# Patient Record
Sex: FEMALE | Race: ASIAN | URBAN - METROPOLITAN AREA
[De-identification: names, ages, dates, MRNs, and addresses within clinical notes are randomized per-mention and may not be internally consistent; named-entity substitution may affect disease eponyms.]

---

## 2017-04-14 ENCOUNTER — HOSPITAL ENCOUNTER (OUTPATIENT)
Facility: CLINIC | Age: 22
Setting detail: OBSERVATION
Discharge: HOME | End: 2017-04-15
Attending: EMERGENCY MEDICINE | Admitting: EMERGENCY MEDICINE
Payer: COMMERCIAL

## 2017-04-14 DIAGNOSIS — T78.1XXA ADVERSE FOOD REACTION, INITIAL ENCOUNTER: ICD-10-CM

## 2017-04-14 DIAGNOSIS — T78.00XA ANAPHYLAXIS DUE TO FOOD, INITIAL ENCOUNTER: ICD-10-CM

## 2017-04-14 DIAGNOSIS — T78.04XA ANAPHYLACTIC REACTION DUE TO FRUITS AND VEGETABLES, INITIAL ENCOUNTER: ICD-10-CM

## 2017-04-14 DIAGNOSIS — J45.909 ASTHMATIC BRONCHITIS, UNSPECIFIED ASTHMA SEVERITY, UNCOMPLICATED: ICD-10-CM

## 2017-04-14 PROBLEM — T78.2XXA ANAPHYLACTIC REACTION: Status: ACTIVE | Noted: 2017-04-14

## 2017-04-14 PROCEDURE — 96372 THER/PROPH/DIAG INJ SC/IM: CPT | Performed by: EMERGENCY MEDICINE

## 2017-04-14 PROCEDURE — 96375 TX/PRO/DX INJ NEW DRUG ADDON: CPT | Performed by: EMERGENCY MEDICINE

## 2017-04-14 PROCEDURE — 99285 EMERGENCY DEPT VISIT HI MDM: CPT | Mod: Z6 | Performed by: EMERGENCY MEDICINE

## 2017-04-14 PROCEDURE — 25000128 H RX IP 250 OP 636

## 2017-04-14 PROCEDURE — 25000128 H RX IP 250 OP 636: Performed by: EMERGENCY MEDICINE

## 2017-04-14 PROCEDURE — 99285 EMERGENCY DEPT VISIT HI MDM: CPT | Mod: 25 | Performed by: EMERGENCY MEDICINE

## 2017-04-14 PROCEDURE — 96374 THER/PROPH/DIAG INJ IV PUSH: CPT | Performed by: EMERGENCY MEDICINE

## 2017-04-14 RX ORDER — SODIUM CHLORIDE 9 MG/ML
INJECTION, SOLUTION INTRAVENOUS CONTINUOUS
Status: DISCONTINUED | OUTPATIENT
Start: 2017-04-14 | End: 2017-04-15 | Stop reason: HOSPADM

## 2017-04-14 RX ORDER — ALBUTEROL SULFATE 0.83 MG/ML
2.5 SOLUTION RESPIRATORY (INHALATION)
Status: DISCONTINUED | OUTPATIENT
Start: 2017-04-14 | End: 2017-04-15 | Stop reason: HOSPADM

## 2017-04-14 RX ORDER — METHYLPREDNISOLONE SODIUM SUCCINATE 125 MG/2ML
125 INJECTION, POWDER, LYOPHILIZED, FOR SOLUTION INTRAMUSCULAR; INTRAVENOUS ONCE
Status: COMPLETED | OUTPATIENT
Start: 2017-04-14 | End: 2017-04-14

## 2017-04-14 RX ORDER — LIDOCAINE 40 MG/G
CREAM TOPICAL
Status: DISCONTINUED | OUTPATIENT
Start: 2017-04-14 | End: 2017-04-15

## 2017-04-14 RX ORDER — EPINEPHRINE 1 MG/ML
INJECTION INTRAMUSCULAR; INTRAVENOUS; SUBCUTANEOUS
Status: COMPLETED
Start: 2017-04-14 | End: 2017-04-14

## 2017-04-14 RX ORDER — EPINEPHRINE 0.3 MG/.3ML
0.3 INJECTION SUBCUTANEOUS
Status: DISCONTINUED | OUTPATIENT
Start: 2017-04-14 | End: 2017-04-15 | Stop reason: HOSPADM

## 2017-04-14 RX ORDER — LIDOCAINE 40 MG/G
CREAM TOPICAL
Status: DISCONTINUED | OUTPATIENT
Start: 2017-04-14 | End: 2017-04-15 | Stop reason: HOSPADM

## 2017-04-14 RX ORDER — EPINEPHRINE 1 MG/ML
0.3 INJECTION INTRAMUSCULAR; INTRAVENOUS; SUBCUTANEOUS
Status: COMPLETED | OUTPATIENT
Start: 2017-04-14 | End: 2017-04-14

## 2017-04-14 RX ORDER — DIPHENHYDRAMINE HYDROCHLORIDE 50 MG/ML
50 INJECTION INTRAMUSCULAR; INTRAVENOUS ONCE
Status: COMPLETED | OUTPATIENT
Start: 2017-04-14 | End: 2017-04-14

## 2017-04-14 RX ADMIN — DIPHENHYDRAMINE HYDROCHLORIDE 50 MG: 50 INJECTION, SOLUTION INTRAMUSCULAR; INTRAVENOUS at 20:19

## 2017-04-14 RX ADMIN — EPINEPHRINE 0.3 MG: 1 INJECTION INTRAMUSCULAR; INTRAVENOUS; SUBCUTANEOUS at 20:10

## 2017-04-14 RX ADMIN — RANITIDINE HYDROCHLORIDE 50 MG: 25 INJECTION INTRAMUSCULAR; INTRAVENOUS at 20:21

## 2017-04-14 RX ADMIN — METHYLPREDNISOLONE SODIUM SUCCINATE 125 MG: 125 INJECTION, POWDER, LYOPHILIZED, FOR SOLUTION INTRAMUSCULAR; INTRAVENOUS at 20:23

## 2017-04-14 ASSESSMENT — ENCOUNTER SYMPTOMS
FACIAL SWELLING: 1
COLOR CHANGE: 1
TROUBLE SWALLOWING: 1
VOMITING: 0

## 2017-04-14 NOTE — IP AVS SNAPSHOT
Unit [address] Observation [address] [address]    Phone:  [phone]    Fax:  [phone]                                       After Visit Summary   4/14/2017    Rosalie Arambula    MRN: [id number]           After Visit Summary Signature Page     I have received my discharge instructions, and my questions have been answered. I have discussed any challenges I see with this plan with the nurse or doctor.    ..........................................................................................................................................  Patient/Patient Representative Signature      ..........................................................................................................................................  Patient Representative Print Name and Relationship to Patient    ..................................................               ................................................  Date                                            Time    ..........................................................................................................................................  Reviewed by Signature/Title    ...................................................              ..............................................  Date                                                            Time

## 2017-04-14 NOTE — IP AVS SNAPSHOT
MRN:[id number]                      After Visit Summary   4/14/2017    Rosalie Arambula    MRN: [id number]           Thank you!     Thank you for choosing [facility] for your care. Our goal is always to provide you with excellent care. Hearing back from our patients is one way we can continue to improve our services. Please take a few minutes to complete the written survey that you may receive in the mail after you visit with us. Thank you!        Patient Information     Date Of Birth          1995        Designated Caregiver       Most Recent Value    Caregiver    Will someone help with your care after discharge? no      About your hospital stay     You were admitted on:  April 14, 2017 You last received care in the:  Unit 6D Observation [facility]    You were discharged on:  April 15, 2017        Reason for your hospital stay       You were admitted to observation after you had an allergic reaction to jackfruit. You were given steroids, benadryl, zantac, Epi-pen, and Claritin. Your symptoms resolved and after > 12 hours you did not have any recurrent symptoms. You will be discharged with Claritin for a few days and as needed after this, Benadryl as needed, and an Epi-pen as needed.    If you begin to feel the symptoms of another reaction in the future, use the Epi-Pen to inject yourself, and then call 911. Don t wait until symptoms become severe.                  Who to Call     For medical emergencies, please call 911.  For non-urgent questions about your medical care, please call your primary care provider or clinic, [phone]          Attending Provider     Provider Damion Nesbitt MD Emergency Medicine    Leon Rodríguez MD Emergency Medicine       Primary Care Provider Office Phone # Fax #    Kita Marquez [phone]       [facility] [address]  [facility] [address]         When to contact your care team       Return to the ED  with throat swelling, lip swelling, shortness of breath, chest pain, wheezing, uncontrolled nausea, vomiting, unrelieved pain, bleeding not relieved with pressure, dizziness, chest pain, shortness of breath, loss of consciousness, and any new or concerning symptoms.                  After Care Instructions     Activity       Your activity upon discharge: activity as tolerated and no driving while taking sedating medications.            Diet       Follow this diet upon discharge:    Regular Diet Adult, Avoid Ren fruit                  Follow-up Appointments     Adult [facility] Follow-up and recommended labs and tests       Follow up with primary care provider, Dr. Cole on Tuesday April 18 at 1:35 PM for hospital follow- up and to establish care. Please bring a list of medication with you and consider getting your previous medical records transferred to there. Your primary doctor can consider doing allergy testing. You can also discuss a medical alert bracelet.     [facility]  [address] [place] [address]   4 th Floor   Appointments on [facility] and/or [facility] (with [facility] or [facility] provider or service). Call [phone] if you haven't heard regarding these appointments within 7 days of discharge.                  Your next 10 appointments already scheduled     Apr 18, 2017  1:50 PM CDT   (Arrive by 1:35 PM)   New Patient Visit with Mahin iMttal MD   [facility] ([facility])    [address] [place] [address]  4th Floor  [facility] [address] [phone]              Pending Results     No orders found for last 3 day(s).            Statement of Approval     Ordered          04/15/17 1158  I have reviewed and agree with all the recommendations and orders detailed in this document.  EFFECTIVE NOW     Approved and electronically signed by:  Ginette Hall APRN CNP             Admission Information     Date & Time  "Provider Department Dept. Phone    2017 Leon Rodríguez MD Unit 6D Observation [facility] [phone]      Your Vitals Were     Blood Pressure Pulse Temperature Respirations Height Weight    119/66 (BP Location: Right leg) 67 98.4  F (36.9  C) (Oral) 16 1.549 m (5' 1\") 56.7 kg (125 lb)    Pulse Oximetry BMI (Body Mass Index)                97% 23.62 kg/m2          [employer] Information     [employer] lets you send messages to your doctor, view your test results, renew your prescriptions, schedule appointments and more. To sign up, go to [email or web address] . Click on \"Log in\" on the left side of the screen, which will take you to the Welcome page. Then click on \"Sign up Now\" on the right side of the page.     You will be asked to enter the access code listed below, as well as some personal information. Please follow the directions to create your username and password.     Your access code is: [email or web address]  Expires: 2017 11:49 AM     Your access code will  in 90 days. If you need help or a new code, please call your [place] clinic or [phone].        Care EveryWhere ID     This is your Care EveryWhere ID. This could be used by other organizations to access your [facility] [place] medical records  [id number]           Review of your medicines      START taking        Dose / Directions    diphenhydrAMINE 25 MG capsule   Commonly known as:  BENADRYL   Used for:  Anaphylaxis due to food, initial encounter        Dose:  25-50 mg   Take 1-2 capsules (25-50 mg) by mouth every 6 hours as needed for itching   Quantity:  20 capsule   Refills:  0       EPINEPHrine 0.3 MG/0.3ML injection   Used for:  Anaphylaxis due to food, initial encounter        Dose:  0.3 mg   Inject 0.3 mLs (0.3 mg) into the muscle once as needed for other (For airway compromise, wheezing, SOB, stridor)   Quantity:  0.6 mL   Refills:  0       loratadine 10 MG tablet   Commonly known as:  CLARITIN   Used for:  Anaphylaxis due to " food, initial encounter        Dose:  10 mg   Take 1 tablet (10 mg) by mouth daily For 1-2 days after discharge, then as needed after this   Quantity:  10 tablet   Refills:  0         CONTINUE these medicines which have NOT CHANGED        Dose / Directions    albuterol 108 (90 BASE) MCG/ACT Inhaler   Commonly known as:  PROAIR HFA/PROVENTIL HFA/VENTOLIN HFA        Dose:  2 puff   Inhale 2 puffs into the lungs every 6 hours as needed for shortness of breath / dyspnea or wheezing   Refills:  0       SINGULAIR PO        Refills:  0            Where to get your medicines      These medications were sent to [facility] - [place], [place] - [address]  [address] [address]     Phone:  [phone]     diphenhydrAMINE 25 MG capsule    EPINEPHrine 0.3 MG/0.3ML injection    loratadine 10 MG tablet                Protect others around you: Learn how to safely use, store and throw away your medicines at www.disposemymeds.org.             Medication List: This is a list of all your medications and when to take them. Check marks below indicate your daily home schedule. Keep this list as a reference.      Medications           Morning Afternoon Evening Bedtime As Needed    albuterol 108 (90 BASE) MCG/ACT Inhaler   Commonly known as:  PROAIR HFA/PROVENTIL HFA/VENTOLIN HFA   Inhale 2 puffs into the lungs every 6 hours as needed for shortness of breath / dyspnea or wheezing                                diphenhydrAMINE 25 MG capsule   Commonly known as:  BENADRYL   Take 1-2 capsules (25-50 mg) by mouth every 6 hours as needed for itching                                EPINEPHrine 0.3 MG/0.3ML injection   Inject 0.3 mLs (0.3 mg) into the muscle once as needed for other (For airway compromise, wheezing, SOB, stridor)                                loratadine 10 MG tablet   Commonly known as:  CLARITIN   Take 1 tablet (10 mg) by mouth daily For 1-2 days after discharge, then as needed  after this   Last time this was given:  10 mg on 4/15/2017  9:13 AM                                IBIS CHIN                                          More Information        Anaphylaxis  Anaphylaxis is the term for a severe allergic reaction. It may begin within minutes, up to a couple hours after exposure to the substance you are allergic to ( allergen ). Symptoms include nausea, vomiting, diarrhea or stomach cramps, itchy rash (hives), swelling of the eyes, lips, face or tongue, wheezing, difficulty breathing or swallowing, throat tightness, chest pain, dizziness or fainting.  This kind of reaction can be life-threatening. Fortunately, your case has responded to treatment. Any remaining symptoms should resolve within 6 to 24 hours.  If you are exposed to the same substance again, you may have the same or more severe reaction. Treatment for anaphylaxis is epinephrine (adrenalin). This is available by prescription as Epi-Pen for self-injection. If the cause of your reaction is known, you should avoid exposure in the future. If the cause is not known, follow up with your doctor for special testing to determine what you are allergic to.  Home care    Rest at home for the next 24 hours.    Avoid tobacco and alcohol consumption. These may worsen your symptoms.    If you know what caused your reaction today, avoid that in the future since the next reaction may be worse. Let your family members, friends and personal physician know about your allergic reaction.    If your allergy was to food, learn how to read food labels so you can check for the offending substance. If a product does not have a label, it is best to avoid it.    Consider carrying an identification card or getting a Medic-Alert  bracelet to inform medical personnel of your condition in the event you are not able to do so yourself.    If an Epi-Pen was prescribed, carry it at all times. It can be life-saving. Learn how to use the device. If you begin to  feel the symptoms of another reaction in the future, use the Epi-Pen to inject yourself, and then call 911. Don t wait until symptoms become severe.    Oral Benadryl (diphenhydramine) is an antihistamine available at drug and grocery stores. Unless a prescription antihistamine was given, Benadryl may be used to reduce itching if large areas of the skin are involved. Use lower doses during the daytime and higher doses at bedtime since the drug may make you sleepy. [NOTE: Do not use Benadryl if you have glaucoma or if you are a man with trouble urinating due to an enlarged prostate.] Claritin (loratidine) is an antihistamine that causes less drowsiness and is a good alternative for daytime use.    If you were prescribed any medicines to prevent symptoms from returning, be sure to take them exactly as directed.  Follow-up care  Follow up with your doctor or as advised if you are not improving over the next 1 to 2 days. If you do not know what caused this reaction, skin and blood tests, or an  elimination diet  may be helpful. You may locate an allergy specialist in your area by contacting:    American Academy of Allergy, Asthma & Immunology www.aaaai.org [phone]    American College of Allergy, Asthma & Immunology www.acaai.org  When to seek medical advice  Call your health care provider right away if any of these occur:    Worsening of your symptoms    Trouble breathing or swallowing    Swelling in the mouth or face    Chest pain    Dizziness, weakness or fainting    1251-1776 [employer]. [address] [place] [address], [place] [address]. All rights reserved. This information is not intended as a substitute for professional medical care. Always follow your healthcare professional's instructions.

## 2017-04-15 VITALS
HEART RATE: 67 BPM | HEIGHT: 61 IN | DIASTOLIC BLOOD PRESSURE: 66 MMHG | WEIGHT: 125 LBS | SYSTOLIC BLOOD PRESSURE: 119 MMHG | TEMPERATURE: 98.4 F | OXYGEN SATURATION: 97 % | RESPIRATION RATE: 16 BRPM | BODY MASS INDEX: 23.6 KG/M2

## 2017-04-15 PROCEDURE — 96361 HYDRATE IV INFUSION ADD-ON: CPT

## 2017-04-15 PROCEDURE — 25000132 ZZH RX MED GY IP 250 OP 250 PS 637: Performed by: NURSE PRACTITIONER

## 2017-04-15 PROCEDURE — G0378 HOSPITAL OBSERVATION PER HR: HCPCS

## 2017-04-15 PROCEDURE — 99236 HOSP IP/OBS SAME DATE HI 85: CPT | Mod: Z6 | Performed by: EMERGENCY MEDICINE

## 2017-04-15 PROCEDURE — 25000128 H RX IP 250 OP 636: Performed by: NURSE PRACTITIONER

## 2017-04-15 RX ORDER — ALBUTEROL SULFATE 90 UG/1
2 AEROSOL, METERED RESPIRATORY (INHALATION) EVERY 6 HOURS PRN
COMMUNITY

## 2017-04-15 RX ORDER — DIPHENHYDRAMINE HCL 25 MG
25-50 CAPSULE ORAL EVERY 6 HOURS PRN
Status: DISCONTINUED | OUTPATIENT
Start: 2017-04-15 | End: 2017-04-15 | Stop reason: HOSPADM

## 2017-04-15 RX ORDER — DIPHENHYDRAMINE HCL 25 MG
25-50 CAPSULE ORAL EVERY 6 HOURS
Status: DISCONTINUED | OUTPATIENT
Start: 2017-04-15 | End: 2017-04-15

## 2017-04-15 RX ORDER — DIPHENHYDRAMINE HCL 25 MG
25-50 CAPSULE ORAL EVERY 6 HOURS PRN
Status: DISCONTINUED | OUTPATIENT
Start: 2017-04-15 | End: 2017-04-15

## 2017-04-15 RX ORDER — EPINEPHRINE 0.3 MG/.3ML
0.3 INJECTION SUBCUTANEOUS
Qty: 0.6 ML | Refills: 0 | Status: SHIPPED | OUTPATIENT
Start: 2017-04-15

## 2017-04-15 RX ORDER — DIPHENHYDRAMINE HCL 25 MG
25-50 CAPSULE ORAL EVERY 6 HOURS PRN
Qty: 20 CAPSULE | Refills: 0 | Status: SHIPPED | OUTPATIENT
Start: 2017-04-15

## 2017-04-15 RX ORDER — LORATADINE 10 MG/1
10 TABLET ORAL DAILY
Status: DISCONTINUED | OUTPATIENT
Start: 2017-04-15 | End: 2017-04-15 | Stop reason: HOSPADM

## 2017-04-15 RX ORDER — LORATADINE 10 MG/1
10 TABLET ORAL DAILY
Qty: 10 TABLET | Refills: 0 | Status: SHIPPED | OUTPATIENT
Start: 2017-04-15

## 2017-04-15 RX ADMIN — SODIUM CHLORIDE: 9 INJECTION, SOLUTION INTRAVENOUS at 00:33

## 2017-04-15 RX ADMIN — LORATADINE 10 MG: 10 TABLET ORAL at 09:13

## 2017-04-15 NOTE — PLAN OF CARE
Problem: Discharge Planning  Goal: Discharge Planning (Adult, OB, Behavioral, Peds)  List all goals to be met before discharge home:     - No return of symptoms after 12 hours of observation : IN PROGRESS  - No oropharyngeal edema or bronchospasm. Nursing to check every 30 minutes for the first 4 hours then hourly for the next 4 hours. Notify BERTHA of alteration : YES - no edema noted, tongue still slightly swollen. Will monitor  - Vital signs normal or at patient baseline (BP >100/80, HR between 60 and 100, notify BERTHA of alteration. Check BP, HR and respirations (14 to 20 per minute)Nursing to check every 30 minutes for the first 4 hours then hourly for the next 4 hours. Notify BERTHA of alteration : YES  -Report any wheezing, stridor, poor color, tachycardia or hives immediately   - Tolerating oral intake to maintain hydration : NO  - Dyspnea improved if present and oxygen saturations greater than 94% on room air. IF below 94% notify BERTHA immediately : YES - no dyspnea at this time  -Patient to awake to voice on each check. If not, notify BERTHA immediately : YES  - Safe disposition plan has been identified : NO        Patient remains very sedated and lethargic, but arouses to voice and is able to answer questions appropriately. Patient denies pain, itching, swelling or other symptoms at this time.    Nurse to notify provider when observation goals have been met and patient is ready for discharge.

## 2017-04-15 NOTE — ED PROVIDER NOTES
History     Chief Complaint   Patient presents with     Allergic Reaction     food     HPI  Rosalie Arambula is a 22-year-old female with a past history significant for asthma who presented to the emergency room with friends after having an allergic reaction.  Approximately 20 minutes prior to presentation she ate some jackfruit and began to feel itchy and swelling in her throat and swelling of her lips and face.  She noted that the skin on her face turned red.  She s never had a reaction like this in the past.  There was no syncope or vomiting and no difficulty breathing.      This part of the medical record was transcribed by Eduardo Kitchen Medical Scribe, from a dictation done by Damion Montoya MD.   I have reviewed the Medications, Allergies, Past Medical and Surgical History, and Social History in the [facility] system.    PAST MEDICAL HISTORY  Past Medical History:   Diagnosis Date     Allergic state      PAST SURGICAL HISTORY  History reviewed. No pertinent surgical history.  FAMILY HISTORY  No family history on file.  SOCIAL HISTORY  Social History   Substance Use Topics     Smoking status: Never Smoker     Smokeless tobacco: Not on file     Alcohol use No     MEDICATIONS  Current Facility-Administered Medications   Medication     lidocaine 1 % 1 mL     lidocaine (LMX4) kit     sodium chloride (PF) 0.9% PF flush 3 mL     sodium chloride (PF) 0.9% PF flush 3 mL     lidocaine 1 % 1 mL     lidocaine (LMX4) kit     sodium chloride (PF) 0.9% PF flush 3 mL     sodium chloride (PF) 0.9% PF flush 3 mL     EPINEPHrine injection 0.3 mg     albuterol neb solution 2.5 mg     0.9% sodium chloride infusion     ALLERGIES  Allergies   Allergen Reactions     Fruit Extracts Anaphylaxis     Oral swelling, hives. Allergic to Ren Fruie      Review of Systems   HENT: Positive for facial swelling and trouble swallowing.    Gastrointestinal: Negative for vomiting.   Skin: Positive for color change.   Neurological: Negative for  "syncope.   All other systems reviewed and are negative.      Physical Exam   BP: 119/75  Pulse: 63  Temp: 97  F (36.1  C)  Resp: 12  Height: 154.9 cm (5' 1\")  Weight: 56.7 kg (125 lb)  SpO2: 97 %  Physical Exam   Constitutional: She is oriented to person, place, and time. Vital signs are normal. She appears well-developed and well-nourished.  Non-toxic appearance. She does not appear ill. No distress.   Patient is awake and alert, she appears mildly anxious but is otherwise in no acute distress.  Voice is mildly hoarse but she is protecting her airway without difficulty and handling her secretions without difficulty.   HENT:   Head: Normocephalic and atraumatic.   Mouth/Throat: Oropharynx is clear and moist. No oropharyngeal exudate.   Mild angioedema of upper and lower lips as well as her the periorbital area is noted.   Eyes: Conjunctivae and EOM are normal. Pupils are equal, round, and reactive to light. No scleral icterus.   Neck: Normal range of motion. Neck supple. No JVD present. No tracheal deviation present. No thyromegaly present.   Cardiovascular: Normal rate, regular rhythm, normal heart sounds and intact distal pulses.  Exam reveals no gallop and no friction rub.    No murmur heard.  Pulmonary/Chest: Effort normal and breath sounds normal. No respiratory distress.   Abdominal: Soft. Bowel sounds are normal. She exhibits no distension and no mass. There is no tenderness.   Musculoskeletal: Normal range of motion. She exhibits no edema or tenderness.   Lymphadenopathy:     She has no cervical adenopathy.   Neurological: She is alert and oriented to person, place, and time. She has normal strength. No cranial nerve deficit or sensory deficit.   Skin: Skin is warm and dry. Rash (erythematous rash noted on face) noted. No erythema. No pallor.   Psychiatric: She has a normal mood and affect. Her behavior is normal.   Nursing note and vitals reviewed.      ED Course     ED Course     Procedures      "       Critical Care time:  was 30 minutes for this patient excluding procedures.      Assessments & Plan (with Medical Decision Making)   This patient presented emergency department after having a reaction to a jackfruit.  Given the rash, angioedema of the lips and face and the sensation of throat closing up I feel that the symptoms are consistent with an anaphylactic reaction.  She was given a 0.3 mg of intramuscular epinephrine 1-1000 and 50 mg of IV Benadryl, 10 mg of IV Zantac, and 125 mg of IV Solu-Medrol.  Symptoms improved and the patient began to sleep secondary to the Benadryl.  She was kept monitored in the emergency department and did not have return of symptoms.  Given the nature of the allergen ingestion I feel that she is potentially at risk for biphasic reaction and feel that overnight observation is warranted.  Case was discussed with the BERTHA in the observation unit and patient will be transferred to the observation unit for further treatment and evaluation.  She will need an EpiPen prescribed at discharge given the nature of this reaction.    This part of the medical record was transcribed by Eduardo Kitchen, Medical Scribe, from a dictation done by Damion Montoya MD.     I have reviewed the nursing notes.    I have reviewed the findings, diagnosis, plan and need for follow up with the patient.    There are no discharge medications for this patient.      Final diagnoses:   Anaphylaxis due to food, initial encounter       4/14/2017   [facility], [place], EMERGENCY DEPARTMENT     Damion Montoya MD  04/14/17 9638

## 2017-04-15 NOTE — H&P
"  Admission Date: 04/14/17  Attending Physician: Dr. Rodríguez  Primary Care Physician: Noelle Bowden  NP/PA: Rasheeda Griffin, PRISCA, CNP    REASON FOR ADMISSION:     Chief Complaint   Patient presents with     Allergic Reaction     food     History of Present Illness, per ER MD:   \"Rosalie Arambula is a 22-year-old female with a past history significant for asthma who presented to the emergency room with friends after having an allergic reaction. Approximately 20 minutes prior to presentation she ate some jackfruit and began to feel itchy and swelling in her throat and swelling of her lips and face. She noted that the skin on her face turned red. She s never had a reaction like this in the past. There was no syncope or vomiting and no difficulty breathing\".   ED Course: IV established. MEDS: 0.3 mg IM epi 1:1000, Benadryl 50 mg IV, Zantac 50 mg IV, Solu-medrol 125 mg IV.  Patient will be admitted to the ED observation unit to monitor for biphasic reaction and possible return of sx's.   Observation Unit Arrival: Arrived on unit 2255, sleepy but awakens to voice. Accompanied by parents.     REVIEW OF SYSTEMS:  Unchanged from ER.   PAST MEDICAL HISTORY:  Past Medical History:   Diagnosis Date     Allergic state      PAST SURGICAL HISTORY:  History reviewed. No pertinent surgical history.    SOCIAL HISTORY:  Social History   Substance Use Topics     Smoking status: Never Smoker     Smokeless tobacco: Not on file     Alcohol use No     FAMILY HISTORY:  Patient is adopted, no family history available    ALLERGIES:  Ren Fruit    MEDICATIONS:  Infrequently used albuterol inhaler    PHYSICAL EXAM:   111/65, T: 97, P: 63, R: 12    All vital signs were reviewed.  GENERAL APPEARENCE: Pleasant, generally appears well, A/O x4 but very sleepy. NAD. Denies allergy sx's  SKIN: Clean, dry, and intact without visible lesions, rash, jaundice, cyanosis, erythema, ecchymoses to exposed areas. No hives  HEENT: NCAT w/out masses, " lesions, or abnormalities. Sclera anicteric, PERRLA, EOMI.  Oral mucosa pink and moist without erythema, exudate, lesions, ulcerations, or thrush. Teeth and gums normal.    NECK: Supple, no masses. No jugular venous distention.   CARDIOVASCULAR: S1, S2 RRR. No murmurs, rubs, or gallops.   RESPIRATORY: Respiratory effort WNL. CTA  bilaterally without crackles/rales/wheeze   GI: Active BS in all 4 quadrants. Abdomen soft and non-tender. No masses or hepatosplenomegaly.  : Deferred  MUSCULOSKELETAL: Gait is not observed as patient is sleeping. Strength 5/5 in major muscle groups of bilateral UE and LE.  Extremities normal, no gross deformities noted, non-tender to palpation.   PV: 2+ bilateral radial and pedal pulses. No edema noted.   NEURO: CN II-XII grossly intact. Speech normal. Appropriate throughout interview. Sleepy, mother supplying info  Sensation grossly WNL.   PSYCH: Appropriate affect  HEME/LYMPH: No visible bleeding. No palpable mandibular/cervical/supra/infraclavicular lymph nodes  PSYCHIATRIC: Mentation and affect appear normal  VASCULAR ACCESS: CDI without erythema or discharge. Non-tender.      DATA :  No results found for this or any previous visit (from the past 24 hour(s)).    ASSESSMENT/PLAN:   Rosalie Arambula is a 22-year-old female with an asthma history, who presented to the emergency room with an allergic reaction to jackfruit and began to feel itchy and swelling in her throat and swelling of her lips and face. She noted that the skin on her face turned red. She s never had a reaction like this in the past. There was no syncope or vomiting and no difficulty breathing. She is admitting to the obs unit s/p tx in the ER, for continued monitoring for return of her allergic sx's. Patient is quite sleepy after receiving medications, especially Benadryl. Majority of history obtained from mother. Minimal information in chart.     #. Allergic Reaction to Food: As above. No previous hx of allergic  reactions. Patient had no respiratory involvement except a bit of hoarse voice. She did have mild angioedema of upper and lower lips as well as her periorbital area, while in the ER. TX included;  IV established. MEDS: 0.3 mg IM epi 1:1000, Benadryl 50 mg IV, Zantac 50 mg IV, Solu-medrol 125 mg IV. Patient has been very sleepy after receiving the Benadryl. Arrived to ED observation unit with no c/o. Mild swelling of her tongue, no SOB, no urticaria, no wheezing or SOB.   -Monitor on ED observation unit overnight for any signs of biphasic allergic rxn.   -Continuous pulse oximetry monitoring.   -Epi 0.3 mg of 1;000 if any respiratory sx's arise, consider Benadryl,   -Will need an epi pen and associated instructions upon discharge. Expect home today.   -F/U with PCP and might consider allergy testing as outpatient.     #. Asthma: Mother stating that patient has a history of exercise induced asthma but patient seldom uses her inhaler, tends to be a minimalist doesn't like medications. Very minimal if any recent use of inhaler.    -Albuterol inhaler PRN    FEN:  -Regular diet as tolerated.  -Monitor BMP and replace electrolytes per protocol    Prophy:  -No VTE prophy as patient is up ad pilar and anticipate short observation stay   -Encourage ambulation as tolerated   -for GI prophy, has had Zantac in ER as part of allergy treatment.     Consults: None expected    CODE STATUS:  FULL CODE      DISPOSITION: Return to previous living arrangement tomorrow when sx's of allergic reaction have completely resolved.     2200 Request from Dr. Wan to admit patient for overnight observation after allergic reaction to Ren Fruit. Patient has resolved allergy sx's with pharmacological treatment and is stable, sleepy from Benadryl.     2300 Patient is very sleepy from Benadryl. No swelling of airway noted, easy respirations at 20 per minute, oxygen sats at 98% on R/A. No urticaria, VSS. Mother will be staying with patient    0230  Patient now awakening and up to the BR, taking po fluids. No signs of allergic rxn.     0500 Still sleepy but awakens easily. No sign of allergic rxn.     PRISCA Johnson, CNP  Emergency Department Observation Unit

## 2017-04-15 NOTE — ED NOTES
Pt was out at a campus function that was serving  food and she was fine the whole time they were eating until she took some fruit at the end, pt states she right away felt her lips swelling and tingling and her throat  Was scratchy and she felt in was hard to swallow and she felt some shortness of breath, her friends brought her here and pt states her symptoms are better now but not resolved.

## 2017-04-15 NOTE — PLAN OF CARE
Problem: Goal Outcome Summary  Goal: Goal Outcome Summary  Goal: Discharge Planning (Adult, OB, Behavioral, Peds)  List all goals to be met before discharge home:   - No return of symptoms after 12 hours of observation: IN PROCESS - no symptoms since arrival to observation unit (5 hours ago)  - No oropharyngeal edema or bronchospasm. Nursing to check every 30 minutes for the first 4 hours then hourly for the next 4 hours. Notify BERTHA of alteration : YES  - Vital signs normal or at patient baseline (BP >100/80, HR between 60 and 100, notify BERTHA of alteration. Check BP, HR and respirations (14 to 20 per minute)Nursing to check every 30 minutes for the first 4 hours then hourly for the next 4 hours. Notify BERTHA of alteration : YES  -Report any wheezing, stridor, poor color, tachycardia or hives immediately   - Tolerating oral intake to maintain hydration : YES, patient tolerating water and ginger ale at this time  - Dyspnea improved if present and oxygen saturations greater than 94% on room air. IF below 94% notify BERTHA immediately : YES  -Patient to awake to voice on each check. If not, notify BERTHA immediately : YES  - Safe disposition plan has been identified : NO      Patient is now alert and oriented. Patient is easy to arouse to voice and is able to stay awake to answer questions and follows commands appropriately. Mother remains at the bedside. Patient denies pain, swelling, hives. No symptoms noted.  Patient up to BR, voided 400 cc and tolerating diet      Nurse to notify provider when observation goals have been met and patient is ready for discharge.

## 2017-04-15 NOTE — PROGRESS NOTES
6:50 AM  Patient seen and examined, electronic medical record reviewed.  Plan discussed with BERTHA.  Subjective: patient has no current complaints and states that her tongue is no longer swollen nor has she seen a rash.  She is swallowing and breathing without difficulty.  Objective:    Vitals:    04/15/17 0500 04/15/17 0545 04/15/17 0600 04/15/17 0700   BP: 102/60  101/58 104/62   BP Location:       Pulse:       Resp:  16     Temp:       TempSrc:       SpO2: 98%  98% 98%   Weight:       Height:         Chest is clear to auscultation.   Cardiovascular exam with a regular rate and rhythm.  Oropharynx reveals no obvious swelling.  Skin without rash or lesion.  No results found for this or any previous visit.  Assessment/plan: 22-year-old female who experienced tongue and lip swelling following ingestion of jackfruit. Antihistamine made patient quite sedated and she slept the majority of the night uninterrupted. Patient will be discharged on instructions in use of an antihistamine scheduled for the next 24-48 hours and also be provided with prescription for EpiPen.

## 2017-04-15 NOTE — PLAN OF CARE
Problem: Discharge Planning  Goal: Discharge Planning (Adult, OB, Behavioral, Peds)  List all goals to be met before discharge home:   - No return of symptoms after 12 hours of observation: IN PROCESS - no symptoms since arrival to observation unit (5 hours ago)  - No oropharyngeal edema or bronchospasm. Nursing to check every 30 minutes for the first 4 hours then hourly for the next 4 hours. Notify BERTHA of alteration : YES  - Vital signs normal or at patient baseline (BP >100/80, HR between 60 and 100, notify BERTHA of alteration. Check BP, HR and respirations (14 to 20 per minute)Nursing to check every 30 minutes for the first 4 hours then hourly for the next 4 hours. Notify BERTHA of alteration :  YES  -Report any wheezing, stridor, poor color, tachycardia or hives immediately   - Tolerating oral intake to maintain hydration : YES, patient tolerating water and ginger ale at this time  - Dyspnea improved if present and oxygen saturations greater than 94% on room air. IF below 94% notify BERTHA immediately : YES  -Patient to awake to voice on each check. If not, notify BERTHA immediately : YES  - Safe disposition plan has been identified : NO       Patient is now alert and oriented. Patient is easy to arouse to voice and is able to stay awake to answer questions and follows commands appropriately. Mother remains at the bedside. Patient denies pain, swelling, hives. No symptoms noted.     Nurse to notify provider when observation goals have been met and patient is ready for discharge.

## 2017-04-15 NOTE — DISCHARGE SUMMARY
"ED Observation Discharge Summary    Rosalie Arambula   MRN# [id number]  Age: 22 year old   YOB: 1995            Date of Admission: 04/14/2017    Date of Discharge: 04/15/2017  Admitting Physician: Dr. Leon Rodríguez MD  Discharge Physician: Dr. Kemi MD/Ginette Hall CNP    DISCHARGE DIAGNOSIS:   1. Allergic Reaction to Food  2. Asthma     INTERVAL HISTORY: VSS, afebrile. Eating and drinking well. No further throat swelling, angioedema, lip swelling. No SOB, chest pain. She got very sedated from benadryl. Now A/O x4. She was given Claritin this am. Her mother also tells me she takes Singular at home for asthma. Epi teaching was done prior to discharge. She feels comfortable with injections. Discharge medications and recommendation were reviewed with the patient and her mother. All questions were answered and they feel ready to discharge to home. Denies fevers, chills, headaches, dizziness, vision changes, dysphagia, odynophagia, cough, SOB, wheezing, chest pain/pressure, abdominal pain, N/V,  extremity swelling/weakness/numbness/tingling.          PHYSICAL EXAM:   Blood pressure 119/66, pulse 67, temperature 98.4  F (36.9  C), temperature source Oral, resp. rate 16, height 1.549 m (5' 1\"), weight 56.7 kg (125 lb), SpO2 97 %.     GENERAL APPEARENCE: Pleasant, generally appears well, A/O x4. NAD.  SKIN: Clean, dry, and intact.   HEENT/NECK: NCAT w/out masses, lesions, or abnormalities. Sclera anicteric, PERRLA, EOMI.  Oral mucosa pink and moist without erythema, exudate, lesions, ulcerations, or thrush. Teeth and gums normal. Neck supple, no masses. No jugular venous distention. No lip swelling, tongue swelling, or angioedema.    CARDIOVASCULAR: S1, S2 RRR. No murmurs, rubs, or gallops.   RESPIRATORY: Respiratory effort WNL. CTA  bilaterally without crackles/rales/wheeze   GI: Active BS in all 4 quadrants. Abdomen soft and non-tender. No masses or hepatosplenomegaly.  : " "Deferred  MUSCULOSKELETAL: Extremities normal, no gross deformities noted, non-tender to palpation.   PV: 2+ bilateral radial and pedal pulses. No edema noted.   NEURO: CN II-XII grossly intact. Speech normal. Appropriate throughout interview. HEME/LYMPH: No visible bleeding.   PSYCHIATRIC: Mentation and affect appear normal  VASCULAR ACCESS: CDI without erythema or discharge. Non-tender.    PROCEDURES AND IMAGING:   No results found for this or any previous visit (from the past 24 hour(s)).  DISCHARGE MEDICATIONS:   Current Discharge Medication List      START taking these medications    Details   diphenhydrAMINE (BENADRYL) 25 MG capsule Take 1-2 capsules (25-50 mg) by mouth every 6 hours as needed for itching  Qty: 20 capsule, Refills: 0    Associated Diagnoses: Anaphylaxis due to food, initial encounter      EPINEPHrine 0.3 MG/0.3ML injection Inject 0.3 mLs (0.3 mg) into the muscle once as needed for other (For airway compromise, wheezing, SOB, stridor)  Qty: 0.6 mL, Refills: 0    Associated Diagnoses: Anaphylaxis due to food, initial encounter      loratadine (CLARITIN) 10 MG tablet Take 1 tablet (10 mg) by mouth daily For 1-2 days after discharge, then as needed after this  Qty: 10 tablet, Refills: 0    Associated Diagnoses: Anaphylaxis due to food, initial encounter         CONTINUE these medications which have NOT CHANGED    Details   Montelukast Sodium (SINGULAIR PO)       albuterol (PROAIR HFA/PROVENTIL HFA/VENTOLIN HFA) 108 (90 BASE) MCG/ACT Inhaler Inhale 2 puffs into the lungs every 6 hours as needed for shortness of breath / dyspnea or wheezing               CONSULTATIONS:   Consultation during this admission received from:  N/A    BRIEF HISTORY OF PRESENT ILLNESS:   (Adopted from admission H&P).    \"Rosalie Arambula is a 22-year-old female with a past history significant for asthma who presented to the emergency room with friends after having an allergic reaction. Approximately 20 minutes prior to " "presentation she ate some jackfruit and began to feel itchy and swelling in her throat and swelling of her lips and face. She noted that the skin on her face turned red. She s never had a reaction like this in the past. There was no syncope or vomiting and no difficulty breathing\".   ED Course: IV established. MEDS: 0.3 mg IM epi 1:1000, Benadryl 50 mg IV, Zantac 50 mg IV, Solu-medrol 125 mg IV. Patient will be admitted to the ED observation unit to monitor for biphasic reaction and possible return of sx's.   Observation Unit Arrival: Arrived on unit 2255, sleepy but awakens to voice. Accompanied by parents.\"  ED OBSERVATION COURSE: Rosalie Arambula is a 22-year-old female with an asthma history, who presented to the emergency room with an allergic reaction to jackfruit and began to feel itchy with swelling in her throat and swelling of her lips and face. She noted that the skin on her face turned red. She s never had a reaction like this in the past. There was no syncope or vomiting and no difficulty breathing. She is admitting to the obs unit s/p tx in the ER, for continued monitoring for return of her allergic sx's.      1. Allergic Reaction to Food: As above. No previous hx of allergic reactions. Patient had no respiratory involvement except a bit of hoarse voice. She did have mild angioedema of upper and lower lips as well as her periorbital area, while in the ER. In the ED she was given: 0.3 mg IM epi 1:1000, Benadryl 50 mg IV, Zantac 50 mg IV, Solu-medrol 125 mg IV. Patient was very sleepy after receiving the Benadryl. Her symptoms almost completely resolved. She did have some tongue swelling initially upon arrival to the unit, but this has resolved.  She was stable throughout her Observation stay. She was observed for > 12 hours and was without recurrent symptoms during her stay. No SOB, no urticaria, no wheezing. Given sedation with Benadryl will discharge with Claritin x 2 days. She will discharge with " Benadryl PRN and an Epi pen. She has never used an Epi pen in the past so teaching was complete. She was instructed to follow-up with her PCP early next week. Her PCP is in [facility], and she requested her care be transferred here. I made an appointment for her on 4/18 at 135 PM PCP to consider allergy testing as outpatient. She was instructed to return to the ED with recurrent symptoms as discussed below.      2. Asthma: History of exercise induced asthma and uses albuterol inhaler. At admission she was quite sedated and denied any other use of medication. However, this morning  take Singular.   -Albuterol inhaler PRN  -Resume home Singular     DISCHARGE DISPOSITION:   Discharged to home.     DISCHARGE INSTRUCTIONS AND FOLLOW-UP:    Discharge Procedure Orders  Activity   Order Comments: Your activity upon discharge: activity as tolerated and no driving while taking sedating medications.   Order Specific Question Answer Comments   Is discharge order? Yes      When to contact your care team   Order Comments: Return to the ED with throat swelling, lip swelling, shortness of breath, chest pain, wheezing, uncontrolled nausea, vomiting, unrelieved pain, bleeding not relieved with pressure, dizziness, chest pain, shortness of breath, loss of consciousness, and any new or concerning symptoms.     Reason for your hospital stay   Order Comments: You were admitted to observation after you had an allergic reaction to jackfruit. You were given steroids, benadryl, zantac, Epi-pen, and Claritin. Your symptoms resolved and after > 12 hours you did not have any recurrent symptoms. You will be discharged with Claritin for a few days and as needed after this, Benadryl as needed, and an Epi-pen as needed.    If you begin to feel the symptoms of another reaction in the future, use the Epi-Pen to inject yourself, and then call 911. Don t wait until symptoms become severe.     Adult [facility] Follow-up and recommended labs and tests    Order Comments: Follow up with primary care provider, Dr. Cole on Tuesday April 18 at 1:35 PM for hospital follow- up and to establish care. Please bring a list of medication with you and consider getting your previous medical records transferred to there. Your primary doctor can consider doing allergy testing. You can also discuss a medical alert bracelet.     [facility]  [address] [place] [id number]   4 th Floor   Appointments on [place] and/or [facility] and the [facility] (with [facility] or [facility] provider or service). Call [phone] if you haven't heard regarding these appointments within 7 days of discharge.     Diet   Order Comments: Follow this diet upon discharge:    Regular Diet Adult, Avoid Ren fruit   Order Specific Question Answer Comments   Is discharge order? Yes         Attestation:   I have reviewed today's vital signs, notes, medications, labs and imaging.      PRISCA Lezama, CNP  Emergency Department Observation Unit

## 2017-04-15 NOTE — PLAN OF CARE
Problem: Goal Outcome Summary  Goal: Goal Outcome Summary  Patient is discharged to home with family, eating and drinking, no new swelling noted, denies SOB, face color is fine no face swelling, no problems with the throat per patient, walked around the floor, good urine output.  Teaching on Epipen provided, observed patient provide return demonstration, patient's mom practiced the pen also.  Patient to  her meds at the pharmacy.

## 2017-04-18 ENCOUNTER — OFFICE VISIT (OUTPATIENT)
Dept: INTERNAL MEDICINE | Facility: CLINIC | Age: 22
End: 2017-04-18

## 2017-04-18 VITALS
OXYGEN SATURATION: 96 % | RESPIRATION RATE: 20 BRPM | WEIGHT: 133 LBS | SYSTOLIC BLOOD PRESSURE: 111 MMHG | HEART RATE: 62 BPM | BODY MASS INDEX: 25.13 KG/M2 | DIASTOLIC BLOOD PRESSURE: 61 MMHG

## 2017-04-18 DIAGNOSIS — T78.40XS ALLERGIC REACTION, SEQUELA: Primary | ICD-10-CM

## 2017-04-18 ASSESSMENT — ENCOUNTER SYMPTOMS
SORE THROAT: 0
EYE REDNESS: 1
EYE IRRITATION: 0
DOUBLE VISION: 0
SINUS PAIN: 0
TROUBLE SWALLOWING: 1
EYE WATERING: 0
SINUS CONGESTION: 0
HOARSE VOICE: 0
EYE PAIN: 0
TASTE DISTURBANCE: 0
SMELL DISTURBANCE: 0
NECK MASS: 0

## 2017-04-18 ASSESSMENT — ACTIVITIES OF DAILY LIVING (ADL)
ARE_THERE_SMOKE_DETECTORS_IN_YOUR_HOME?: Y
DO_MEMBERS_OF_YOUR_HOUSEHOLD_WEAR_SEAT_BELTS?: Y
DO_MEMBERS_OF_YOUR_HOUSEHOLD_USE_SAFETY_HELMETS?: Y
ARE_THERE_FIREARMS_IN_YOUR_HOME?: Y
ARE_THERE_CARBON_MONOXIDE_DETECTORS_IN_YOUR_HOME?: Y
DO_MEMBERS_OF_YOUR_HOUSEHOLD_USE_SUNSCREEN?: Y

## 2017-04-18 ASSESSMENT — PAIN SCALES - GENERAL: PAINLEVEL: NO PAIN (0)

## 2017-04-18 NOTE — PROGRESS NOTES
Rosalie Arambula MRN# [id number]   YOB: 1995 Age: 22 year old     Date: April 18, 2017     Reason for visit: Establish care    HPI: 23 y/o F w/ PMH of exercise induced asthma and mild seasonal allergies presents to establish care after hospital visit on 4/14 for anaphylaxis after eating jack fruit. She was in hospital overnight and received a dose of epi, solumedrol and anti histaminic. She has been doing well since discharge, she carries the epi pen at all times. Other than this episode, she doesn't have much history of allergies. For exercise induced asthma, she uses albuterol inhaler about 3-4 /month and she has mild seasonal allergies presenting at itchy eyes which are well controlled with OTC anti histaminic which she takes in spring and fall. Patient is wondering if she needs a skin testing done. She is otherwise healthy, follows a balanced diet and denies any nausea, vomiting, abdominal pain, changes in bowel or urinary habit. She is active physically, exercises 4-5/ week and denies any exertional chest pain, palpitations, dizziness or shortness of breath.     Current Outpatient Prescriptions   Medication Sig Dispense Refill     Montelukast Sodium (SINGULAIR PO)        albuterol (PROAIR HFA/PROVENTIL HFA/VENTOLIN HFA) 108 (90 BASE) MCG/ACT Inhaler Inhale 2 puffs into the lungs every 6 hours as needed for shortness of breath / dyspnea or wheezing       diphenhydrAMINE (BENADRYL) 25 MG capsule Take 1-2 capsules (25-50 mg) by mouth every 6 hours as needed for itching 20 capsule 0     EPINEPHrine 0.3 MG/0.3ML injection Inject 0.3 mLs (0.3 mg) into the muscle once as needed for other (For airway compromise, wheezing, SOB, stridor) 0.6 mL 0     loratadine (CLARITIN) 10 MG tablet Take 1 tablet (10 mg) by mouth daily For 1-2 days after discharge, then as needed after this 10 tablet 0       ROS: Complete 10 point review of systems negative unless mentioned in HPI    Family history: unknown, she is  adopted    Social history: studying neuroscience at [employer], also plays violin. She wants to go into Medicine.   Doesn't smoke or drink EtOH  Not sexually active    PMH: exercise induced asthma  Mild seasonal allergies    Exam:  /61 (BP Location: Right arm, Patient Position: Chair, Cuff Size: Adult Regular)  Pulse 62  Resp 20  Wt 60.3 kg (133 lb)  SpO2 96%  BMI 25.13 kg/m2  General: NAD, pleasant and cooperative with interview and exam  HEENT: Sclera anicteric, oral mucosa moist and without lesions appreciated;   CV: RRR, no murmurs, rubs, gallops or extra heart sounds  Resp: CTAB, no wheezes appreciated  Abdomen: +bs, soft, nontender, nondistended, no organomegaly appreciated   Extremities: No clubbing or cyanosis, no LE edema bilaterally, peripheral pulses full   MSK: no active synovitis or joint deformity appreciated  Skin: Warm and dry, no jaundice, rash or concerning lesions  Neurological: alert and oriented, speech fluent; EOMI, facial muscles symmetric; no gait abnormalities appreciated.  Psychological: appropriate mood       Reviewed prior medical records.    A&P:    # Anaphylactic reaction to jackfruit: Has been doing well since last hospitalization  - recommended to not eat mane fruit again and keep epi pen at all times  - given that she only has mild seasonal allergies (itchy eyes), well controlled w/ OTC antihistaminic- there is no indication of skin allergen testing    # Health maintenance:  - uptodate on immunization including HPV  - continue healthy diet and regular exercise  - recommend seat belts while driving and helmet while biking  - counseled regarding safe sex      Patient care plan discussed with attending physician, Dr. Burgos, who agreed with above.    Mahin Mittal MD  Internal Medicine PGY-3  [phone]  Pt was seen and examined with Dr. Mittal.  I agree with her documentation as noted above.    My additional comments: None    Mook Burgos

## 2017-04-18 NOTE — PATIENT INSTRUCTIONS
[facility] Center Medication Refill Request Information:  * Please contact your pharmacy regarding ANY request for medication refills.  ** [facility] Prescription Fax = [phone]  * Please allow 3 business days for routine medication refills.  * Please allow 5 business days for controlled substance medication refills.     [facility] Test Result notification information:  *You will be notified with in 7-10 days of your appointment day regarding the results of your test.  If you are on MyChart you will be notified as soon as the provider has reviewed the results and signed off on them.

## 2017-04-18 NOTE — NURSING NOTE
Chief Complaint   Patient presents with     Hospital F/U     hospital follow up  4/14/17 for anaphylactic shock from Ren John LPN 1:56 PM on 4/18/2017

## 2017-04-18 NOTE — MR AVS SNAPSHOT
After Visit Summary   4/18/2017    Rosalie Arambula    MRN: [id number]           Patient Information     Date Of Birth          1995        Visit Information        Provider Department      4/18/2017 1:50 PM Mahin Mittal MD [facility]        Care Instructions    [facility] Center Medication Refill Request Information:  * Please contact your pharmacy regarding ANY request for medication refills.  ** [facility] Prescription Fax = [phone]  * Please allow 3 business days for routine medication refills.  * Please allow 5 business days for controlled substance medication refills.     [facility] Test Result notification information:  *You will be notified with in 7-10 days of your appointment day regarding the results of your test.  If you are on MyChart you will be notified as soon as the provider has reviewed the results and signed off on them.        Follow-ups after your visit        Who to contact     Please call your clinic at [phone] to:    Ask questions about your health    Make or cancel appointments    Discuss your medicines    Learn about your test results    Speak to your doctor   If you have compliments or concerns about an experience at your clinic, or if you wish to file a complaint, please contact [facility] Patient Relations at [phone] or email us at [email or web address]         Additional Information About Your Visit        [employer] Information     [employer] is an electronic gateway that provides easy, online access to your medical records. With [employer], you can request a clinic appointment, read your test results, renew a prescription or communicate with your care team.     To sign up for [employer] visit the website at [email or web address]   You will be asked to enter the access code listed below, as well as some personal information. Please follow the directions to create your username and password.     Your  access code is: [email or web address]  Expires: 2017 11:49 AM     Your access code will  in 90 days. If you need help or a new code, please contact your [facility] or call [phone] for assistance.        Care EveryWhere ID     This is your Care EveryWhere ID. This could be used by other organizations to access your [facility] medical records  [id number]        Your Vitals Were     Pulse Respirations Pulse Oximetry BMI (Body Mass Index)          62 20 96% 25.13 kg/m2         Blood Pressure from Last 3 Encounters:   17 111/61   04/15/17 119/66    Weight from Last 3 Encounters:   17 60.3 kg (133 lb)   17 56.7 kg (125 lb)              Today, you had the following     No orders found for display       Primary Care Provider Office Phone # Fax #    Kita Marquez [phone]       [facility] [address]  [facility] [address]        Thank you!     Thank you for choosing [facility]  for your care. Our goal is always to provide you with excellent care. Hearing back from our patients is one way we can continue to improve our services. Please take a few minutes to complete the written survey that you may receive in the mail after your visit with us. Thank you!             Your Updated Medication List - Protect others around you: Learn how to safely use, store and throw away your medicines at www.disposemymeds.org.          This list is accurate as of: 17  2:29 PM.  Always use your most recent med list.                   Brand Name Dispense Instructions for use    albuterol 108 (90 BASE) MCG/ACT Inhaler    PROAIR HFA/PROVENTIL HFA/VENTOLIN HFA     Inhale 2 puffs into the lungs every 6 hours as needed for shortness of breath / dyspnea or wheezing       diphenhydrAMINE 25 MG capsule    BENADRYL    20 capsule    Take 1-2 capsules (25-50 mg) by mouth every 6 hours as needed for itching       EPINEPHrine 0.3 MG/0.3ML injection     0.6 mL     Inject 0.3 mLs (0.3 mg) into the muscle once as needed for other (For airway compromise, wheezing, SOB, stridor)       loratadine 10 MG tablet    CLARITIN    10 tablet    Take 1 tablet (10 mg) by mouth daily For 1-2 days after discharge, then as needed after this       SINGULAIR PO